# Patient Record
(demographics unavailable — no encounter records)

---

## 2024-11-25 NOTE — ASSESSMENT
[FreeTextEntry1] : 21 y/o trans male already on masculinizing hormone affirming therapy since 2019. Discussed hormone affirming treatment for masculinization with testosterone. Reviewed various formulations of testosterone. Pt. prefers injections. Injection teaching reviewed today. Recommended change to 200mg/ml testosterone at 0.5ml weekly for better absorption with lower concentration and try SQ injections. Pt. interested in Testopel. Risks and benefits of testosterone described including polycythemia and liver effects. Reviewed goal testosterone level of 300-400 as trough and 700-800 as peak level. Mid-range ideally should be 500-600 for cis-gender mid male range. Will repeat levels in 4-6 weeks on current dose once more adherent. GYN follow-up recommended. No plans for reproductive assistance. Will needs age-appropriate cancer screening in the future such as mammogram, colonoscopy, PAP. Interested in top surgery referral recommendations given at prior visit. Estrace cream prescribed for vaginal atrophy and dyspareunia.  Prep time with review of labs and interval progress notes and consultations Discussion with patient regarding hormone regimen with management plan, risks and benefits, treatment options and goals of care answering all patients questions and addressing all concerns as well has his mother. Post-visit completion charting, consultation and review Total Time 30 min

## 2024-11-25 NOTE — PHYSICAL EXAM
[Alert] : alert [Well Nourished] : well nourished [Healthy Appearance] : healthy appearance [Obese] : obese [No Acute Distress] : no acute distress [No Proptosis] : no proptosis [Normal Hearing] : hearing was normal [Thyroid Not Enlarged] : the thyroid was not enlarged [No Respiratory Distress] : no respiratory distress [No Accessory Muscle Use] : no accessory muscle use [Normal Rate and Effort] : normal respiratory rate and effort [Clear to Auscultation] : lungs were clear to auscultation bilaterally [Normal S1, S2] : normal S1 and S2 [Normal Rate] : heart rate was normal [Regular Rhythm] : with a regular rhythm [No Edema] : no peripheral edema [Normal Bowel Sounds] : normal bowel sounds [Not Tender] : non-tender [Not Distended] : not distended [Soft] : abdomen soft [No Involuntary Movements] : no involuntary movements were seen [Oriented x3] : oriented to person, place, and time [Normal Affect] : the affect was normal [Normal Mood] : the mood was normal [de-identified] : +septal nose ring

## 2024-11-25 NOTE — REVIEW OF SYSTEMS
[Fatigue] : no fatigue [Recent Weight Gain (___ Lbs)] : no recent weight gain [Recent Weight Loss (___ Lbs)] : recent weight loss: [unfilled] lbs [Visual Field Defect] : no visual field defect [Dysphagia] : no dysphagia [Dysphonia] : no dysphonia [Chest Pain] : no chest pain [Palpitations] : no palpitations [Shortness Of Breath] : no shortness of breath [Nausea] : no nausea [Vomiting] : no vomiting [Polyuria] : no polyuria [Irregular Menses] : irregular menses [Headaches] : no headaches [Tremors] : no tremors [Depression] : depression [Polydipsia] : no polydipsia [Cold Intolerance] : no cold intolerance [Heat Intolerance] : no heat intolerance [All other systems negative] : All other systems negative

## 2024-11-25 NOTE — HISTORY OF PRESENT ILLNESS
[FreeTextEntry1] : Reji is a now 22 year old obese transgender male for follow-up cross sex hormone treatment.  Seen initially by Dr. Tadeo 10/7/2019. He had intake with Dr. Welsh 3/22/2019 and met Dr. Basurto 6/10/2019 for initial intake, and had follow-up 9/9/2019 when he was cleared for cross sex hormone treatment. Of note his father passed away in 2017. Risks and benefits reviewed with them, he is not interested in fertility preservation, both consented to treatment. He was started on 25 mg every 2 weeks of testosterone since 10/30/2019 for first injection training. Dosage as adjusted up steadily. Switched to 0.5ml of 200mg/ml (100mg) SQ every week 8/2022 for easier injections. Last dose September. Having issues with injections.  When he was on testosterone, he noticed deeper voice, facial hair, bottom growth, body fat redistribution, increased appetite, emotional changes with more anger. Facial hair still patchy.  Has noticed some vaginal dryness and pain with intercourse.  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 Gilbert and therapist Jami Ibrahim who is also through [facility]. Now sees psychiatrist Dr. Alexandro Carl. Mother tries to get Reji to go out but has not. Now on Straterra and Zoloft.  Binding with GC2B <8 hours a day. Interested in top surgery seen by Dr. Fisher.  No interest in bottom surgery. Amenorrhea since spring 2021 unless off testosterone. No hx of oligomenorrhea. Never seen by GYN.  He has had no headaches, no polyuria, no polydipsia, no constipation, no fatigue, no abdominal pain and no vomiting. No LE edema or calf pain.  No personal hx of thromboembolic disease or liver disorders. Grandmother with breast ca. Mom tested negative for BRCA. No tobacco use.  No worsening depression or anxiety.

## 2024-11-25 NOTE — DATA REVIEWED
[FreeTextEntry1] : Labs 1/24/2022:\par  Testosterone 290\par  Estradiol 65\par  CMP normal except ALT 66\par  A1c 5.4%\par  Hb 16.8\par  TG 89\par  Chol 165\par  HDL 39\par

## 2025-05-06 NOTE — HISTORY OF PRESENT ILLNESS
[FreeTextEntry1] : Reji is a now 22 year old obese transgender male for follow-up cross sex hormone treatment.  Seen initially by Dr. Tadeo 10/7/2019. He had intake with Dr. Welsh 3/22/2019 and met Dr. Basurto 6/10/2019 for initial intake, and had follow-up 9/9/2019 when he was cleared for cross sex hormone treatment. Of note his father passed away in 2017. Risks and benefits reviewed with them, he is not interested in fertility preservation, both consented to treatment. He was started on 25 mg every 2 weeks of testosterone since 10/30/2019 for first injection training. Dosage as adjusted up steadily. Switched to 0.5ml of 200mg/ml (100mg) SQ every week 8/2022 for easier injections. Last took 0.5 ml around 2 weeks ago. Having issues with injections.  When he was on testosterone, he noticed deeper voice, facial hair, bottom growth, body fat redistribution, increased appetite, emotional changes with more anger. Facial hair still patchy.  Has noticed some vaginal dryness and pain with intercourse.  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 Gilbert and therapist Jami Ibrahim who is also through [facility]. Now sees psychiatrist Dr. Alexandro Carl. Mother tries to get Reji to go out but has not. Now on Straterra and Zoloft.  Binding with GC2B <8 hours a day. Interested in top surgery seen by Dr. Fisher.  No interest in bottom surgery. Amenorrhea since spring 2021 unless off testosterone. No hx of oligomenorrhea. Never seen by GYN.  He has had no headaches, no polyuria, no polydipsia, no constipation, no fatigue, no abdominal pain and no vomiting. No LE edema or calf pain.  No personal hx of thromboembolic disease or liver disorders. Grandmother with breast ca. Mom tested negative for BRCA. No tobacco use.  No worsening depression or anxiety.

## 2025-05-06 NOTE — REVIEW OF SYSTEMS
[Irregular Menses] : irregular menses [Depression] : depression [All other systems negative] : All other systems negative [Fatigue] : no fatigue [Recent Weight Gain (___ Lbs)] : recent weight gain: [unfilled] lbs [Recent Weight Loss (___ Lbs)] : no recent weight loss [Visual Field Defect] : no visual field defect [Dysphagia] : no dysphagia [Dysphonia] : no dysphonia [Chest Pain] : no chest pain [Palpitations] : no palpitations [Shortness Of Breath] : no shortness of breath [Nausea] : no nausea [Vomiting] : no vomiting [Polyuria] : no polyuria [Headaches] : no headaches [Tremors] : no tremors [Polydipsia] : no polydipsia [Cold Intolerance] : no cold intolerance [Heat Intolerance] : no heat intolerance

## 2025-05-06 NOTE — PHYSICAL EXAM
[Alert] : alert [Well Nourished] : well nourished [Healthy Appearance] : healthy appearance [Obese] : obese [No Acute Distress] : no acute distress [No Proptosis] : no proptosis [Normal Hearing] : hearing was normal [Thyroid Not Enlarged] : the thyroid was not enlarged [No Respiratory Distress] : no respiratory distress [No Accessory Muscle Use] : no accessory muscle use [Normal Rate and Effort] : normal respiratory rate and effort [Clear to Auscultation] : lungs were clear to auscultation bilaterally [Normal S1, S2] : normal S1 and S2 [Normal Rate] : heart rate was normal [Regular Rhythm] : with a regular rhythm [No Edema] : no peripheral edema [Normal Bowel Sounds] : normal bowel sounds [Not Tender] : non-tender [Not Distended] : not distended [Soft] : abdomen soft [No Involuntary Movements] : no involuntary movements were seen [Oriented x3] : oriented to person, place, and time [Normal Affect] : the affect was normal [Normal Mood] : the mood was normal [de-identified] : +septal nose ring

## 2025-05-06 NOTE — ASSESSMENT
[FreeTextEntry1] : 23 y/o trans male already on masculinizing hormone affirming therapy since 2019. Discussed hormone affirming treatment for masculinization with testosterone. Reviewed various formulations of testosterone. Pt. prefers injections. Injection teaching reviewed today. Recommended change to 200mg/ml testosterone at 0.5ml weekly for better absorption with lower concentration and try SQ injections. Pt. interested in Testopel. Risks and benefits of testosterone described including polycythemia and liver effects. Reviewed goal testosterone level of 300-400 as trough and 700-800 as peak level. Mid-range ideally should be 500-600 for cis-gender mid male range. Will repeat levels in 4-6 weeks on current dose once more adherent. GYN follow-up recommended. No plans for reproductive assistance. Will needs age-appropriate cancer screening in the future such as mammogram, colonoscopy, PAP. Interested in top surgery referral recommendations given at prior visit. Estrace cream prescribed for vaginal atrophy and dyspareunia.  Prep time with review of labs and interval progress notes and consultations Discussion with patient regarding hormone regimen with management plan, risks and benefits, treatment options and goals of care answering all patients questions and addressing all concerns as well has his mother. Post-visit completion charting, consultation and review Total Time 30 min